# Patient Record
(demographics unavailable — no encounter records)

---
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
## 2024-10-11 NOTE — PLAN
[FreeTextEntry4] : Anxiety is under control.\par  \par  Attention and concentration is good.\par  \par  Sleep is good.\par  \par  Health is stable, routine follow up\par  \par  patient requires monthly RX for ongoing adhd, goal to function well on a daily basis in socio occupational functioning

## 2024-10-11 NOTE — REASON FOR VISIT
[Patient preference] : as per patient preference [Telehealth (audio & video) - Individual/Group] : This visit was provided via telehealth using real-time 2-way audio visual technology. [Verbal consent obtained from patient/other participant(s)] : Verbal consent for telehealth/telephonic services obtained from patient/other participant(s) [Home] : at home, [unfilled] , at the time of the visit. [Medical Office: ([facility])___] : at the medical office located in  [Patient] : the patient [This encounter was initiated by telehealth (audio with video) and converted to telephone (audio only) due to technical difficulties.] : This encounter was initiated by telehealth (audio with video) and converted to telephone (audio only) due to technical difficulties. [FreeTextEntry4] : 1218pm [FreeTextEntry5] : [id number] [FreeTextEntry2] : 07/22, ISTOP checked [FreeTextEntry1] : "I am good."

## 2024-10-11 NOTE — CURRENT PSYCHIATRIC SYMPTOMS
[Depressed Mood] : no depressed mood [Anhedonia] : no anhedonia [Decreased Concentration] : no decrease in concentrating ability [Insomnia] : no insomnia disorder [Distractibility] : not distracted [Excessive Worry] : no excessive worries [Ruminations] : no rumination disorder [Re-experiencing] : no re-experiencing
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
## 2024-10-11 NOTE — PHYSICAL EXAM
[None] : none [Normal] : good [1 - Normal] : 1 - Normal  (not at all ill, symptoms of disorder not present past seven days) [4 - No change] : 4 - No change  (symptoms remain essentially unchanged) [Anxious] : no anxious [FreeTextEntry8] : good

## 2024-10-11 NOTE — FAMILY HISTORY
[FreeTextEntry1] : Family composition: never , no children\par  Family history and background: raised by both parents, parents unmarried, predominantly lived with mother, 4 siblings (30 y/o F, 26 y/o F, 18 y/o F, 25 y/o M), currently lives with father\par  Family relationship: supportive relationship with parents and siblings "easy going with each other"\par  Pertinent Family Medical, MH and Substance Use History including Adult Child of Alcoholic and child of substance abuse status; history of cancer and heart disease\par  Sisters - undiagnosed anxiety\par  Mother - stomach CA \par

## 2024-11-15 NOTE — REASON FOR VISIT
[Patient preference] : as per patient preference [Telehealth (audio & video) - Individual/Group] : This visit was provided via telehealth using real-time 2-way audio visual technology. [Verbal consent obtained from patient/other participant(s)] : Verbal consent for telehealth/telephonic services obtained from patient/other participant(s) [Home] : at home, [unfilled] , at the time of the visit. [Medical Office: ([facility])___] : at the medical office located in  [Patient] : the patient [This encounter was initiated by telehealth (audio with video) and converted to telephone (audio only) due to technical difficulties.] : This encounter was initiated by telehealth (audio with video) and converted to telephone (audio only) due to technical difficulties. [Patient's space is appropriate for telehealth and maintains privacy/confidentiality.] : Patient's space is appropriate for telehealth and maintains privacy/confidentiality. [FreeTextEntry4] : 1211pm [FreeTextEntry5] : 1235pm [FreeTextEntry2] : 07/22, ISTOP checked [FreeTextEntry1] : "I want to increase the medication."
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
## 2024-11-15 NOTE — FAMILY HISTORY
[FreeTextEntry1] : Family composition: never , no children\par  Family history and background: raised by both parents, parents unmarried, predominantly lived with mother, 4 siblings (30 y/o F, 24 y/o F, 20 y/o F, 27 y/o M), currently lives with father\par  Family relationship: supportive relationship with parents and siblings "easy going with each other"\par  Pertinent Family Medical, MH and Substance Use History including Adult Child of Alcoholic and child of substance abuse status; history of cancer and heart disease\par  Sisters - undiagnosed anxiety\par  Mother - stomach CA \par
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
## 2024-12-13 NOTE — REASON FOR VISIT
[Patient preference] : as per patient preference [Telehealth (audio & video) - Individual/Group] : This visit was provided via telehealth using real-time 2-way audio visual technology. [Patient's space is appropriate for telehealth and maintains privacy/confidentiality.] : Patient's space is appropriate for telehealth and maintains privacy/confidentiality. [Verbal consent obtained from patient/other participant(s)] : Verbal consent for telehealth/telephonic services obtained from patient/other participant(s) [Home] : at home, [unfilled] , at the time of the visit. [Medical Office: ([facility])___] : at the medical office located in  [Patient] : the patient [This encounter was initiated by telehealth (audio with video) and converted to telephone (audio only) due to technical difficulties.] : This encounter was initiated by telehealth (audio with video) and converted to telephone (audio only) due to technical difficulties. [FreeTextEntry4] : 6863pm [FreeTextEntry5] : [id number] [FreeTextEntry2] : 07/22, ISTOP checked [FreeTextEntry1] : "I am good."
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
## 2025-01-02 NOTE — HISTORY OF PRESENT ILLNESS
[Mild Persistent] : mild persistent [Doing Well] : doing well [Well Controlled] : Well controlled [Wheezing] : stable wheezing [Cough] : stable coughing [Checks Regularly] : The patient checks ~his/her~ peak flow regularly [Good Control] : peak flow has been good [None] : None [Adherent] : the patient is adherent with ~his/her~ medication regimen [Goals--Doing Well] : the patient is not doing well with ~his/her~ asthma goals [PFTs] : pulmonary function tests

## 2025-01-10 NOTE — FAMILY HISTORY
[FreeTextEntry1] : Family composition: never , no children\par  Family history and background: raised by both parents, parents unmarried, predominantly lived with mother, 4 siblings (30 y/o F, 26 y/o F, 20 y/o F, 27 y/o M), currently lives with father\par  Family relationship: supportive relationship with parents and siblings "easy going with each other"\par  Pertinent Family Medical, MH and Substance Use History including Adult Child of Alcoholic and child of substance abuse status; history of cancer and heart disease\par  Sisters - undiagnosed anxiety\par  Mother - stomach CA \par
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
## 2025-01-10 NOTE — REASON FOR VISIT
[Patient preference] : as per patient preference [Telehealth (audio & video) - Individual/Group] : This visit was provided via telehealth using real-time 2-way audio visual technology. [Patient's space is appropriate for telehealth and maintains privacy/confidentiality.] : Patient's space is appropriate for telehealth and maintains privacy/confidentiality. [Verbal consent obtained from patient/other participant(s)] : Verbal consent for telehealth/telephonic services obtained from patient/other participant(s) [Home] : at home, [unfilled] , at the time of the visit. [Medical Office: ([facility])___] : at the medical office located in  [Patient] : the patient [This encounter was initiated by telehealth (audio with video) and converted to telephone (audio only) due to technical difficulties.] : This encounter was initiated by telehealth (audio with video) and converted to telephone (audio only) due to technical difficulties. [FreeTextEntry4] : 1148pm [FreeTextEntry5] : 2837mm [FreeTextEntry2] : 07/22, ISTOP checked [FreeTextEntry1] : "I am well"
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
## 2025-02-07 NOTE — FAMILY HISTORY
[FreeTextEntry1] : Family composition: never , no children\par  Family history and background: raised by both parents, parents unmarried, predominantly lived with mother, 4 siblings (28 y/o F, 24 y/o F, 20 y/o F, 25 y/o M), currently lives with father\par  Family relationship: supportive relationship with parents and siblings "easy going with each other"\par  Pertinent Family Medical, MH and Substance Use History including Adult Child of Alcoholic and child of substance abuse status; history of cancer and heart disease\par  Sisters - undiagnosed anxiety\par  Mother - stomach CA \par
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
## 2025-02-07 NOTE — REASON FOR VISIT
[Patient preference] : as per patient preference [Telehealth (audio & video) - Individual/Group] : This visit was provided via telehealth using real-time 2-way audio visual technology. [Patient's space is appropriate for telehealth and maintains privacy/confidentiality.] : Patient's space is appropriate for telehealth and maintains privacy/confidentiality. [Verbal consent obtained from patient/other participant(s)] : Verbal consent for telehealth/telephonic services obtained from patient/other participant(s) [Home] : at home, [unfilled] , at the time of the visit. [Medical Office: ([facility])___] : at the medical office located in  [Patient] : the patient [This encounter was initiated by telehealth (audio with video) and converted to telephone (audio only) due to technical difficulties.] : This encounter was initiated by telehealth (audio with video) and converted to telephone (audio only) due to technical difficulties. [FreeTextEntry4] : 1149ty [FreeTextEntry5] : 1205pm [FreeTextEntry2] : 07/22, ISTOP checked [FreeTextEntry1] : "I am good."
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
## 2025-03-07 NOTE — REASON FOR VISIT
[Patient preference] : as per patient preference [Telehealth (audio & video) - Individual/Group] : This visit was provided via telehealth using real-time 2-way audio visual technology. [Patient's space is appropriate for telehealth and maintains privacy/confidentiality.] : Patient's space is appropriate for telehealth and maintains privacy/confidentiality. [Verbal consent obtained from patient/other participant(s)] : Verbal consent for telehealth/telephonic services obtained from patient/other participant(s) [Home] : at home, [unfilled] , at the time of the visit. [Medical Office: ([facility])___] : at the medical office located in  [Patient] : the patient [This encounter was initiated by telehealth (audio with video) and converted to telephone (audio only) due to technical difficulties.] : This encounter was initiated by telehealth (audio with video) and converted to telephone (audio only) due to technical difficulties. [FreeTextEntry4] : 1143hv [FreeTextEntry5] : 1205pm [FreeTextEntry2] : 07/22, ISTOP checked [FreeTextEntry1] : "I am fine."
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
## 2025-03-07 NOTE — FAMILY HISTORY
[FreeTextEntry1] : Family composition: never , no children\par  Family history and background: raised by both parents, parents unmarried, predominantly lived with mother, 4 siblings (28 y/o F, 26 y/o F, 20 y/o F, 25 y/o M), currently lives with father\par  Family relationship: supportive relationship with parents and siblings "easy going with each other"\par  Pertinent Family Medical, MH and Substance Use History including Adult Child of Alcoholic and child of substance abuse status; history of cancer and heart disease\par  Sisters - undiagnosed anxiety\par  Mother - stomach CA \par
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
## 2025-05-02 NOTE — REASON FOR VISIT
[Patient preference] : as per patient preference [Telehealth (audio & video) - Individual/Group] : This visit was provided via telehealth using real-time 2-way audio visual technology. [Patient's space is appropriate for telehealth and maintains privacy/confidentiality.] : Patient's space is appropriate for telehealth and maintains privacy/confidentiality. [Verbal consent obtained from patient/other participant(s)] : Verbal consent for telehealth/telephonic services obtained from patient/other participant(s) [Home] : at home, [unfilled] , at the time of the visit. [Medical Office: ([facility])___] : at the medical office located in  [Patient] : the patient [This encounter was initiated by telehealth (audio with video) and converted to telephone (audio only) due to technical difficulties.] : This encounter was initiated by telehealth (audio with video) and converted to telephone (audio only) due to technical difficulties. [FreeTextEntry4] : [id number] [FreeTextEntry5] : [id number] [FreeTextEntry2] : 07/22, ISTOP checked [FreeTextEntry1] : "I am good."
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
## 2025-05-02 NOTE — REASON FOR VISIT
[Patient preference] : as per patient preference [Telehealth (audio & video) - Individual/Group] : This visit was provided via telehealth using real-time 2-way audio visual technology. [Patient's space is appropriate for telehealth and maintains privacy/confidentiality.] : Patient's space is appropriate for telehealth and maintains privacy/confidentiality. [Verbal consent obtained from patient/other participant(s)] : Verbal consent for telehealth/telephonic services obtained from patient/other participant(s) [Home] : at home, [unfilled] , at the time of the visit. [Medical Office: ([facility])___] : at the medical office located in  [Patient] : the patient [This encounter was initiated by telehealth (audio with video) and converted to telephone (audio only) due to technical difficulties.] : This encounter was initiated by telehealth (audio with video) and converted to telephone (audio only) due to technical difficulties. [FreeTextEntry4] : [id number] [FreeTextEntry5] : [id number] [FreeTextEntry2] : 07/22, ISTOP checked [FreeTextEntry1] : "I am good."

## 2025-05-02 NOTE — FAMILY HISTORY
[FreeTextEntry1] : Family composition: never , no children\par  Family history and background: raised by both parents, parents unmarried, predominantly lived with mother, 4 siblings (30 y/o F, 26 y/o F, 20 y/o F, 25 y/o M), currently lives with father\par  Family relationship: supportive relationship with parents and siblings "easy going with each other"\par  Pertinent Family Medical, MH and Substance Use History including Adult Child of Alcoholic and child of substance abuse status; history of cancer and heart disease\par  Sisters - undiagnosed anxiety\par  Mother - stomach CA \par
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
## 2025-05-30 NOTE — REASON FOR VISIT
[Patient preference] : as per patient preference [Telehealth (audio & video) - Individual/Group] : This visit was provided via telehealth using real-time 2-way audio visual technology. [Patient's space is appropriate for telehealth and maintains privacy/confidentiality.] : Patient's space is appropriate for telehealth and maintains privacy/confidentiality. [Verbal consent obtained from patient/other participant(s)] : Verbal consent for telehealth/telephonic services obtained from patient/other participant(s) [Home] : at home, [unfilled] , at the time of the visit. [Medical Office: ([facility])___] : at the medical office located in  [Patient] : the patient [This encounter was initiated by telehealth (audio with video) and converted to telephone (audio only) due to technical difficulties.] : This encounter was initiated by telehealth (audio with video) and converted to telephone (audio only) due to technical difficulties. [FreeTextEntry4] : 0488ga [FreeTextEntry5] : [id number] [FreeTextEntry2] : 07/22, ISTOP checked [FreeTextEntry3] : phone, then video [FreeTextEntry1] : "I am well."

## 2025-06-27 NOTE — FAMILY HISTORY
[FreeTextEntry1] : Family composition: never , no children\par  Family history and background: raised by both parents, parents unmarried, predominantly lived with mother, 4 siblings (28 y/o F, 26 y/o F, 20 y/o F, 27 y/o M), currently lives with father\par  Family relationship: supportive relationship with parents and siblings "easy going with each other"\par  Pertinent Family Medical, MH and Substance Use History including Adult Child of Alcoholic and child of substance abuse status; history of cancer and heart disease\par  Sisters - undiagnosed anxiety\par  Mother - stomach CA \par

## 2025-06-27 NOTE — REASON FOR VISIT
[Patient preference] : as per patient preference [Telehealth (audio & video) - Individual/Group] : This visit was provided via telehealth using real-time 2-way audio visual technology. [Patient's space is appropriate for telehealth and maintains privacy/confidentiality.] : Patient's space is appropriate for telehealth and maintains privacy/confidentiality. [Verbal consent obtained from patient/other participant(s)] : Verbal consent for telehealth/telephonic services obtained from patient/other participant(s) [Home] : at home, [unfilled] , at the time of the visit. [Medical Office: ([facility])___] : at the medical office located in  [Patient] : the patient [This encounter was initiated by telehealth (audio with video) and converted to telephone (audio only) due to technical difficulties.] : This encounter was initiated by telehealth (audio with video) and converted to telephone (audio only) due to technical difficulties. [FreeTextEntry4] : 1148dp [FreeTextEntry5] : [id number] [FreeTextEntry2] : 07/22, ISTOP checked [FreeTextEntry1] : "I am fine."
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
## 2025-07-25 NOTE — REASON FOR VISIT
[Patient preference] : as per patient preference [Telehealth (audio & video) - Individual/Group] : This visit was provided via telehealth using real-time 2-way audio visual technology. [Patient's space is appropriate for telehealth and maintains privacy/confidentiality.] : Patient's space is appropriate for telehealth and maintains privacy/confidentiality. [Verbal consent obtained from patient/other participant(s)] : Verbal consent for telehealth/telephonic services obtained from patient/other participant(s) [Home] : at home, [unfilled] , at the time of the visit. [Medical Office: ([facility])___] : at the medical office located in  [Patient] : the patient [This encounter was initiated by telehealth (audio with video) and converted to telephone (audio only) due to technical difficulties.] : This encounter was initiated by telehealth (audio with video) and converted to telephone (audio only) due to technical difficulties. [FreeTextEntry4] : [id number] [FreeTextEntry5] : [id number] [FreeTextEntry2] : 07/22, ISTOP checked [FreeTextEntry1] : "I am good."